# Patient Record
Sex: MALE | Race: ASIAN | NOT HISPANIC OR LATINO
[De-identification: names, ages, dates, MRNs, and addresses within clinical notes are randomized per-mention and may not be internally consistent; named-entity substitution may affect disease eponyms.]

---

## 2017-03-09 ENCOUNTER — APPOINTMENT (OUTPATIENT)
Dept: UROLOGY | Facility: CLINIC | Age: 72
End: 2017-03-09

## 2017-03-24 ENCOUNTER — APPOINTMENT (OUTPATIENT)
Dept: UROLOGY | Facility: CLINIC | Age: 72
End: 2017-03-24

## 2017-03-24 VITALS
BODY MASS INDEX: 20.25 KG/M2 | RESPIRATION RATE: 17 BRPM | HEART RATE: 96 BPM | SYSTOLIC BLOOD PRESSURE: 135 MMHG | WEIGHT: 129 LBS | OXYGEN SATURATION: 98 % | DIASTOLIC BLOOD PRESSURE: 77 MMHG | HEIGHT: 67 IN | TEMPERATURE: 98.7 F

## 2017-03-29 LAB
APPEARANCE: CLEAR
BACTERIA UR CULT: NORMAL
BACTERIA: NEGATIVE
BILIRUBIN URINE: NEGATIVE
BLOOD URINE: NEGATIVE
COLOR: YELLOW
CORE LAB FLUID CYTOLOGY: NORMAL
GLUCOSE QUALITATIVE U: NORMAL MG/DL
KETONES URINE: NEGATIVE
LEUKOCYTE ESTERASE URINE: NEGATIVE
MICROSCOPIC-UA: NORMAL
NITRITE URINE: NEGATIVE
PH URINE: 6
PROTEIN URINE: NEGATIVE MG/DL
RED BLOOD CELLS URINE: 2 /HPF
SPECIFIC GRAVITY URINE: 1.01
SQUAMOUS EPITHELIAL CELLS: 0 /HPF
UROBILINOGEN URINE: NORMAL MG/DL
WHITE BLOOD CELLS URINE: 2 /HPF

## 2019-07-16 ENCOUNTER — APPOINTMENT (OUTPATIENT)
Dept: UROLOGY | Facility: CLINIC | Age: 74
End: 2019-07-16
Payer: MEDICARE

## 2019-07-16 VITALS
OXYGEN SATURATION: 100 % | RESPIRATION RATE: 17 BRPM | WEIGHT: 125 LBS | DIASTOLIC BLOOD PRESSURE: 76 MMHG | BODY MASS INDEX: 19.58 KG/M2 | HEART RATE: 71 BPM | SYSTOLIC BLOOD PRESSURE: 133 MMHG | TEMPERATURE: 98.2 F

## 2019-07-16 DIAGNOSIS — N39.0 URINARY TRACT INFECTION, SITE NOT SPECIFIED: ICD-10-CM

## 2019-07-16 DIAGNOSIS — A49.9 URINARY TRACT INFECTION, SITE NOT SPECIFIED: ICD-10-CM

## 2019-07-16 PROCEDURE — 51798 US URINE CAPACITY MEASURE: CPT

## 2019-07-16 PROCEDURE — 99214 OFFICE O/P EST MOD 30 MIN: CPT | Mod: 25

## 2019-07-16 RX ORDER — FINASTERIDE 5 MG/1
5 TABLET, FILM COATED ORAL DAILY
Qty: 90 | Refills: 3 | Status: ACTIVE | COMMUNITY
Start: 2019-07-16 | End: 1900-01-01

## 2019-07-16 NOTE — LETTER BODY
[FreeTextEntry1] : Enrique Garcia MD\par [address]\par [address]\[place] [address]\par P [phone]\par F [phone]\par \par Dear Dr. Garcia,\par \par Reason for Visit: BPH. Urinary frequency.\par \par This is a 74-year-old gentleman with BPH status post TURP 5 years ago. The patient returns for follow up. Since the patient's last visit, he complains of frequency, weak uroflow, and hesitancy. He reports that he needs to massage his bladder in order to stimulate urination and complains of incomplete emptying of the bladder. The patient continues to take Proscar regularly with no side effects or difficulties. The patient denies any other changes in health. Patient was scheduled for a cystoscopy 2 years ago, but never returned for chis appointment. Patient denies any dysuria or hematuria.The past medical history, family history and social history are unchanged. All other review of systems are negative. Patient denies any changes in medications. Medication list was reconciled. The patient is on Coumadin and Tikosyn. \par \par On examination, the patient is a healthy-appearing gentleman in no acute distress. He is alert and oriented follows commands. He has normal mood and affect. He is normocephalic. Oral no thrush. Neck is supple. Respirations are unlabored. His abdomen is soft and nontender. Liver is nonpalpable. Bladder is nonpalpable. No CVA tenderness. Neurologically he is grossly intact. No peripheral edema. Skin without gross abnormality. He has normal male external genitalia. Normal meatus. Bilateral testes are descended intrascrotally and normal to palpation. On rectal examination, there is normal sphincter tone. The prostate is clinically benign without focal induration or nodularity.\par \par Post-void residual on bladder scan today was 40 cc. \par \par Assessment: BPH. Gross hematuria, resolved. Urinary frequency.\par \par I counseled the patient. In terms of his urinary frequency, I recommended cystoscopy to further evaluate the patient.  I counseled the patient regarding the procedure. The risks and benefits were discussed. Alternatives were given. I answered the patient questions. The patient declined cystoscopy. In terms of his BPH, I recommend he continue taking Proscar and Rapaflo as directed. The patient will follow up as directed and will contact me with any questions or concerns. Thank you for the opportunity to participate in the care of this patient. I'll keep you updated on his progress.\par \par Plan: BMP. PSA. Continue Proscar and Rapoflo. Follow up as directed.

## 2019-07-16 NOTE — ADDENDUM
[FreeTextEntry1] : Entered by Milton Thacker, acting as scribe for Dr. Robin Pride.\par \par The documentation recorded by the scribe accurately reflects the service I personally performed and the decisions made by me.

## 2019-07-18 LAB
APPEARANCE: CLEAR
BACTERIA UR CULT: NORMAL
BACTERIA: NEGATIVE
BILIRUBIN URINE: NEGATIVE
BLOOD URINE: ABNORMAL
COLOR: NORMAL
GLUCOSE QUALITATIVE U: NEGATIVE
HYALINE CASTS: 0 /LPF
KETONES URINE: NEGATIVE
LEUKOCYTE ESTERASE URINE: NEGATIVE
MICROSCOPIC-UA: NORMAL
NITRITE URINE: NEGATIVE
PH URINE: 6
PROTEIN URINE: NEGATIVE
RED BLOOD CELLS URINE: 2 /HPF
SPECIFIC GRAVITY URINE: 1.01
SQUAMOUS EPITHELIAL CELLS: 0 /HPF
URINE CYTOLOGY: NORMAL
UROBILINOGEN URINE: NORMAL
WHITE BLOOD CELLS URINE: 0 /HPF

## 2019-08-21 ENCOUNTER — RECORD ABSTRACTING (OUTPATIENT)
Age: 74
End: 2019-08-21

## 2019-11-26 ENCOUNTER — APPOINTMENT (OUTPATIENT)
Dept: UROLOGY | Facility: CLINIC | Age: 74
End: 2019-11-26
Payer: MEDICARE

## 2019-11-26 VITALS
RESPIRATION RATE: 18 BRPM | BODY MASS INDEX: 19.58 KG/M2 | WEIGHT: 125 LBS | HEART RATE: 73 BPM | SYSTOLIC BLOOD PRESSURE: 155 MMHG | DIASTOLIC BLOOD PRESSURE: 80 MMHG | OXYGEN SATURATION: 97 %

## 2019-11-26 PROCEDURE — 99214 OFFICE O/P EST MOD 30 MIN: CPT

## 2019-11-26 NOTE — LETTER BODY
[FreeTextEntry1] : Enrique Garcia MD\par [address]\par [address]\[place] [address]\par P [phone]\par F [phone]\par \par Dear Dr. Garcia,\par \par Reason for Visit: BPH. Urinary frequency.\par \par This is a 74 year-old gentleman with BPH status post TURP 5 years ago, presenting with hematuria. The patient returns for follow up. Since the patient's last visit, he obtained a urinalysis demonstrated evidence of hematuria, 11-20 RBC/HPF. He reports he has been non-compliant with his prostate medications. He has not been taking Rapaflo and Proscar regularly as directed because they did not relieve his symptoms. He continues to complain of frequency, weak uroflow, and incomplete emptying of the bladder without medication. Patient denies any dysuria or hematuria. He denies any pain currently. The past medical history, family history and social history are unchanged. All other review of systems are negative. Patient denies any changes in medications. Medication list was reconciled. The patient is on Coumadin and Tikosyn. \par \par On examination, the patient is a healthy-appearing gentleman in no acute distress. He is alert and oriented follows commands. He has normal mood and affect. He is normocephalic. Oral no thrush. Neck is supple. Respirations are unlabored. His abdomen is soft and nontender. Liver is nonpalpable. Bladder is nonpalpable. No CVA tenderness. Neurologically he is grossly intact. No peripheral edema. Skin without gross abnormality. He has normal male external genitalia. Normal meatus. Bilateral testes are descended intrascrotally and normal to palpation. On rectal examination, there is normal sphincter tone. The prostate is clinically benign without focal induration or nodularity.\par \par His BMP demonstrated normal renal functions, creatinine 0.81. His PSA was 2.33, which is within normal limits. His urinalysis demonstrated evidence of hematuria, 11-20 RBC/HPF.\par \par Assessment: BPH. Gross hematuria, resolved. Urinary frequency. Hematuria.\par \par I counseled the patient. In terms of his urinary frequency, I recommend he begin a trial of Myrbetriq to relieve his symptoms. I discussed the potential side effects of the medication. I counseled the patient on its use and side effects. If the patient develops any side effects, the patient will discontinue the medication and contact me. In terms of his hematuria, I advise the patient to follow up in 1 month for renal ultrasound to ensure stability. Risks and alternatives were discussed. I answered the patient questions. The patient will follow-up as directed and will contact me with any questions or concerns. Thank you for the opportunity to participate in the care of Mr. WILKERSON. I will keep you updated on his progress. \par \par Plan: Trial of Myrbetriq. Follow up in 1 month for renal ultrasound.

## 2019-12-17 ENCOUNTER — APPOINTMENT (OUTPATIENT)
Dept: UROLOGY | Facility: CLINIC | Age: 74
End: 2019-12-17
Payer: MEDICARE

## 2019-12-17 PROCEDURE — 99213 OFFICE O/P EST LOW 20 MIN: CPT | Mod: 25

## 2019-12-17 PROCEDURE — 76775 US EXAM ABDO BACK WALL LIM: CPT

## 2019-12-17 NOTE — LETTER BODY
[FreeTextEntry1] : Enrique Garcia MD\par [address]\[place] [address]\par P [phone]\par F [phone]\par \par Dear Dr. Garcia,\par \par Reason for Visit: BPH. Urinary frequency. Left ureteral calculus.\par \par This is a 74 year-old gentleman with BPH status post TURP 5 years ago, presenting with hematuria. The patient returns today for renal ultrasound. Since the patient's last visit, he obtained a CT abdomen and pelvis that demonstrated a left ureteral calculus. He reports he has been taking Rapaflo and Proscar regularly as directed. He reports of stable urinary symptoms with medication. He reports of frequent hydration. He denies any interval difficulties or complaints. Patient denies any dysuria or hematuria. He has no pain currently. The past medical history, family history and social history are unchanged. All other review of systems are negative. Patient denies any changes in medications. Medication list was reconciled. The patient is on Coumadin and Tikosyn. \par \par On examination, the patient is a healthy-appearing gentleman in no acute distress. He is alert and oriented follows commands. He has normal mood and affect. He is normocephalic. Oral no thrush. Neck is supple. Respirations are unlabored. His abdomen is soft and nontender. Liver is nonpalpable. Bladder is nonpalpable. No CVA tenderness. Neurologically he is grossly intact. No peripheral edema. Skin without gross abnormality. \par \par His CT abdomen and pelvis demonstrated a nonobstructing distal left ureteral calculus measuring 0.6 cm. There was also evidence of enlarged median lobe of the prostate, indenting the urinary bladder base.\par \par I personally reviewed renal ultrasound with the patient today. Images demonstrated a 6.0 mm echogenic focus with strong twinkle artifact visualized in the distal left ureteral area, probably a stone, left ureteral jet present. Both kidneys appear normal in size and echogenicity without any intrarenal stones, solid masses or hydronephrosis visualized.\par \par Assessment: BPH. Gross hematuria, resolved. Urinary frequency. Hematuria. Left ureteral stone.\par \par I counseled the patient. His renal ultrasound today demonstrated a small left ureteral stone and resolution of hydronephrosis. I advised the patient to discontinue Potassium citrate. I encouraged hydration to facilitate the passage of his stone and to prevent future stone formation. In terms of his BPH, the patient currently denies any urinary difficulties and requests to discontinue Rapaflo. I recommended the patient stop taking Rapaflo, but continue Flomax to prevent the return of his symptoms. Risks and alternatives were discussed. I answered the patient questions. He will obtain BMP and PSA today to ensure stability. The patient will follow-up as directed and will contact me with any questions or concerns. Thank you for the opportunity to participate in the care of Mr. WILKERSON. I will keep you updated on his progress. \par \par Plan: Discontinue Rapaflo and Potassium Citrate. Continue Flomax. BMP. PSA. Follow up in 6 months for renal US.

## 2020-01-14 ENCOUNTER — APPOINTMENT (OUTPATIENT)
Dept: UROLOGY | Facility: CLINIC | Age: 75
End: 2020-01-14

## 2020-06-23 ENCOUNTER — APPOINTMENT (OUTPATIENT)
Dept: UROLOGY | Facility: CLINIC | Age: 75
End: 2020-06-23

## 2023-07-25 ENCOUNTER — APPOINTMENT (OUTPATIENT)
Dept: UROLOGY | Facility: CLINIC | Age: 78
End: 2023-07-25
Payer: MEDICARE

## 2023-07-25 VITALS
WEIGHT: 110 LBS | RESPIRATION RATE: 18 BRPM | DIASTOLIC BLOOD PRESSURE: 75 MMHG | SYSTOLIC BLOOD PRESSURE: 116 MMHG | TEMPERATURE: 97.8 F | OXYGEN SATURATION: 96 % | BODY MASS INDEX: 17.23 KG/M2 | HEART RATE: 106 BPM

## 2023-07-25 DIAGNOSIS — N13.8 BENIGN PROSTATIC HYPERPLASIA WITH LOWER URINARY TRACT SYMPMS: ICD-10-CM

## 2023-07-25 DIAGNOSIS — Z00.00 ENCOUNTER FOR GENERAL ADULT MEDICAL EXAMINATION W/OUT ABNORMAL FINDINGS: ICD-10-CM

## 2023-07-25 DIAGNOSIS — R31.0 GROSS HEMATURIA: ICD-10-CM

## 2023-07-25 DIAGNOSIS — N28.1 CYST OF KIDNEY, ACQUIRED: ICD-10-CM

## 2023-07-25 DIAGNOSIS — R97.20 ELEVATED PROSTATE, SPECIFIC ANTIGEN [PSA]: ICD-10-CM

## 2023-07-25 DIAGNOSIS — N40.1 BENIGN PROSTATIC HYPERPLASIA WITH LOWER URINARY TRACT SYMPMS: ICD-10-CM

## 2023-07-25 PROCEDURE — 52000 CYSTOURETHROSCOPY: CPT

## 2023-07-25 PROCEDURE — 76775 US EXAM ABDO BACK WALL LIM: CPT

## 2023-07-25 PROCEDURE — 99204 OFFICE O/P NEW MOD 45 MIN: CPT | Mod: 25

## 2023-07-25 RX ORDER — MIRABEGRON 25 MG/1
25 TABLET, FILM COATED, EXTENDED RELEASE ORAL
Qty: 30 | Refills: 3 | Status: DISCONTINUED | COMMUNITY
Start: 2019-11-26 | End: 2023-07-25

## 2023-07-25 RX ORDER — SILODOSIN 8 MG/1
8 CAPSULE ORAL DAILY
Qty: 90 | Refills: 3 | Status: ACTIVE | COMMUNITY
Start: 2019-07-16 | End: 1900-01-01

## 2023-07-25 NOTE — ADDENDUM
[FreeTextEntry1] : Entered by Lina Márquez, acting as scribe for Dr. Robin Pride.\par The documentation recorded by the scribe accurately reflects the service I personally performed and the decisions made by me.

## 2023-07-25 NOTE — LETTER BODY
[FreeTextEntry1] : Radha Murphy MD\par [address] [place] [address], \[place] [address]\par [phone]\par \par Dear Dr. Murphy,\par \par Reason for visit: Hematuria. BPH\par \par This is a 78 year year-old male with hematuria and BPH referred for evaluation. He was last seen 4 years ago. He declined follow-up for pandemic. Patient reports he has weak uroflow, frequency, and hesitancy. He denies any hematuria or urinary incontinence. His symptoms are aggravated by hydration. He denies any alleviating factors. He has tried Rapaflo and Proscar for medical therapy previously. He reports no pain. He denies any dysuria or urinary incontinence. The patient denies any aggravating or relieving factors. The patient denies any interference of function. The patient is entirely asymptomatic. All other review of systems are negative. He has no cancer in his family medical history. He has no previous surgical history. Past medical history, family history and social history were inquired and were noncontributory to current condition. The patient does not use tobacco or drink alcohol.\par \par Medications and allergies were reviewed. He has no known allergies to medication. On examination, the patient is a well-appearing male in no acute distress. He is alert and\par oriented and follows commands. He has normal mood and affect. He is normocephalic. Neck is supple. Oral no thrush. Respirations are unlabored. Abdomen is soft and nontender. Bladder is nonpalpable. No CVA tenderness. Neurologically He is grossly intact. No peripheral edema. Skin without gross abnormality.\par \par Assessment: Hematuria. BPH.\par \par I counseled the patient on the various etiologies of the hematuria. I discussed the risk of occult malignancy. I counseled the patient about hematuria. I recommended patient obtain urinalysis, urine cytology, cystoscopy, and renal ultrasound. I counseled the patient about the procedures. In terms of BPH, I discussed the natural history of BPH and the treatment options available. I discussed the options of conservative management with fluid in dietary restrictions, herbal therapy, medical therapy, and minimally invasive procedures. Risk and benefits were discussed. I renewed the patient's prescription for Proscar and Rapaflo today. I encouraged the patient to continue medications regularly as directed. He will repeat PSA and BMP to establish baseline. I answered the patient's questions. The risks and expected outcomes were discussed. The patient will follow up as directed and contact me with any questions or concerns. Thank you for the opportunity to participate in the care of Mr. WILKERSON. I will keep you updated on his progress.\par \par Plan: Cystoscopy. Renal ultrasound. Urinalysis. Urine cytology. BMP. PSA. Follow up as directed.

## 2023-08-05 LAB
ANION GAP SERPL CALC-SCNC: 13 MMOL/L
APPEARANCE: CLEAR
BACTERIA: NEGATIVE /HPF
BILIRUBIN URINE: NEGATIVE
BLOOD URINE: ABNORMAL
BUN SERPL-MCNC: 15 MG/DL
CALCIUM SERPL-MCNC: 9.4 MG/DL
CAST: 1 /LPF
CHLORIDE SERPL-SCNC: 98 MMOL/L
CO2 SERPL-SCNC: 22 MMOL/L
COLOR: YELLOW
CREAT SERPL-MCNC: 0.8 MG/DL
EGFR: 91 ML/MIN/1.73M2
EPITHELIAL CELLS: 1 /HPF
GLUCOSE QUALITATIVE U: NEGATIVE MG/DL
GLUCOSE SERPL-MCNC: 201 MG/DL
KETONES URINE: NEGATIVE MG/DL
LEUKOCYTE ESTERASE URINE: ABNORMAL
MICROSCOPIC-UA: NORMAL
NITRITE URINE: NEGATIVE
PH URINE: 6
POTASSIUM SERPL-SCNC: 4 MMOL/L
PROTEIN URINE: NORMAL MG/DL
PSA FREE FLD-MCNC: 31 %
PSA FREE SERPL-MCNC: 0.58 NG/ML
PSA SERPL-MCNC: 1.86 NG/ML
RED BLOOD CELLS URINE: 7 /HPF
SODIUM SERPL-SCNC: 134 MMOL/L
SPECIFIC GRAVITY URINE: 1.01
URINE CYTOLOGY: NORMAL
UROBILINOGEN URINE: 0.2 MG/DL
WHITE BLOOD CELLS URINE: 6 /HPF

## 2023-09-05 ENCOUNTER — NON-APPOINTMENT (OUTPATIENT)
Age: 78
End: 2023-09-05

## 2023-09-19 ENCOUNTER — APPOINTMENT (OUTPATIENT)
Dept: UROLOGY | Facility: CLINIC | Age: 78
End: 2023-09-19
Payer: MEDICARE

## 2023-09-19 VITALS
SYSTOLIC BLOOD PRESSURE: 137 MMHG | TEMPERATURE: 98.1 F | HEART RATE: 84 BPM | DIASTOLIC BLOOD PRESSURE: 75 MMHG | BODY MASS INDEX: 17.7 KG/M2 | OXYGEN SATURATION: 98 % | WEIGHT: 113 LBS | RESPIRATION RATE: 18 BRPM

## 2023-09-19 PROCEDURE — 99213 OFFICE O/P EST LOW 20 MIN: CPT

## 2023-09-20 LAB
APPEARANCE: CLEAR
BACTERIA: NEGATIVE /HPF
BILIRUBIN URINE: NEGATIVE
BLOOD URINE: NEGATIVE
CAST: 0 /LPF
COLOR: YELLOW
EPITHELIAL CELLS: 0 /HPF
GLUCOSE QUALITATIVE U: NEGATIVE MG/DL
KETONES URINE: NEGATIVE MG/DL
LEUKOCYTE ESTERASE URINE: ABNORMAL
MICROSCOPIC-UA: NORMAL
NITRITE URINE: NEGATIVE
PH URINE: 5.5
PROTEIN URINE: NEGATIVE MG/DL
RED BLOOD CELLS URINE: 2 /HPF
SPECIFIC GRAVITY URINE: 1.02
URINE CYTOLOGY: NORMAL
UROBILINOGEN URINE: 0.2 MG/DL
WHITE BLOOD CELLS URINE: 11 /HPF

## 2024-07-13 ENCOUNTER — NON-APPOINTMENT (OUTPATIENT)
Age: 79
End: 2024-07-13

## 2024-08-05 ENCOUNTER — NON-APPOINTMENT (OUTPATIENT)
Age: 79
End: 2024-08-05

## 2024-08-06 ENCOUNTER — APPOINTMENT (OUTPATIENT)
Dept: UROLOGY | Facility: CLINIC | Age: 79
End: 2024-08-06

## 2024-08-06 PROCEDURE — G2211 COMPLEX E/M VISIT ADD ON: CPT | Mod: NC

## 2024-08-06 PROCEDURE — 99214 OFFICE O/P EST MOD 30 MIN: CPT | Mod: 25

## 2024-08-06 PROCEDURE — 51798 US URINE CAPACITY MEASURE: CPT

## 2024-08-06 NOTE — ADDENDUM
[FreeTextEntry1] : Entered by Ham Patel, acting as scribe for Dr. Robin Pride. The documentation recorded by the scribe accurately reflects the service I personally performed and the decisions made by me.

## 2024-08-06 NOTE — LETTER BODY
[FreeTextEntry1] : Radha Murphy MD  [address] [place] [address]  [phone]    Dear Dr. Murphy,    Reason for visit: Hematuria. BPH    This is a 79-year-old male with hematuria and BPH. The patient has a history of prostate surgery in 2014. Patient is here for follow-up. Since he was last seen, the patient was diagnosed with anemia. The type was unspecified. He also reports obtaining a negative colonoscopy and endoscopy. The patient reports taking Rapaflo and Proscar.  Patient reports taking the medications regularly without any side effects or difficulties with the medication. He notes progressive urinary symptoms despite medical therapy. He denies any hematuria or urinary incontinence. His symptoms are aggravated by hydration. He denies any alleviating factors. He reports no pain. He denies any dysuria or urinary incontinence. The patient denies any aggravating or relieving factors. The patient denies any interference of function. The patient is entirely asymptomatic. All other review of systems are negative. He has no cancer in his family medical history. He has no previous surgical history. Past medical history, family history and social history were inquired and were noncontributory to current condition. The patient does not use tobacco or drink alcohol.    Medications and allergies were reviewed. He has no known allergies to medication. On examination, the patient is a well-appearing male in no acute distress. He is alert and oriented and follows commands. He has normal mood and affect. He is normocephalic. Neck is supple. Oral no thrush. Respirations are unlabored. Abdomen is soft and nontender. Bladder is nonpalpable. No CVA tenderness. Neurologically He is grossly intact. No peripheral edema. Skin without gross abnormality.   His BMP demonstrated normal renal function, creatinine 0.80. His PSA was 1.86, WNL.    Post-void residual on bladder scan today was 15 cc.    Assessment: Hematuria. BPH.    I counseled the patient. His PVR today was 15 cc. In terms of his BPH, the patient reported progressive symptoms despite taking Rapaflo and Proscar. I recommended the patient undergo cystoscopy and UDS to evaluate the urinary outlet. I counseled the patient regarding the procedure. The patient will take the necessary preparations for the procedure. He will also continue to take Rapaflo and Proscar.  I renewed the patient's prescription for Rapaflo and Proscar today. I encouraged the patient to continue medications regularly as directed. He will repeat PSA and BMP to establish baseline.  I also recommended patient obtain urinalysis and urine cytology to look for high grade urothelial carcinoma. I answered the patient's questions. The risks and expected outcomes were discussed. The patient will follow up as directed and contact me with any questions or concerns. Thank you for the opportunity to participate in the care of Mr. WILKERSON. I will keep you updated on his progress.    Plan: Continue Rapaflo and Proscar. Cystoscopy. UDS. PSA. BMP. Urinalysis. Urine cytology. Follow up as directed.

## 2024-09-05 ENCOUNTER — APPOINTMENT (OUTPATIENT)
Dept: UROLOGY | Facility: CLINIC | Age: 79
End: 2024-09-05
Payer: MEDICARE

## 2024-09-05 DIAGNOSIS — N13.8 BENIGN PROSTATIC HYPERPLASIA WITH LOWER URINARY TRACT SYMPMS: ICD-10-CM

## 2024-09-05 DIAGNOSIS — R97.20 ELEVATED PROSTATE, SPECIFIC ANTIGEN [PSA]: ICD-10-CM

## 2024-09-05 DIAGNOSIS — N40.1 BENIGN PROSTATIC HYPERPLASIA WITH LOWER URINARY TRACT SYMPMS: ICD-10-CM

## 2024-09-05 PROCEDURE — 51728 CYSTOMETROGRAM W/VP: CPT | Mod: 26

## 2024-09-05 PROCEDURE — 51741 ELECTRO-UROFLOWMETRY FIRST: CPT | Mod: 26

## 2024-09-05 PROCEDURE — 51784 ANAL/URINARY MUSCLE STUDY: CPT | Mod: 26

## 2024-09-05 PROCEDURE — 99214 OFFICE O/P EST MOD 30 MIN: CPT | Mod: 25

## 2024-09-05 PROCEDURE — 52000 CYSTOURETHROSCOPY: CPT

## 2024-09-05 PROCEDURE — 51797 INTRAABDOMINAL PRESSURE TEST: CPT | Mod: 26

## 2024-09-05 RX ORDER — TROSPIUM CHLORIDE 20 MG/1
20 TABLET, FILM COATED ORAL
Qty: 30 | Refills: 3 | Status: ACTIVE | COMMUNITY
Start: 2024-09-05 | End: 1900-01-01

## 2024-09-07 NOTE — LETTER BODY
[FreeTextEntry1] :   Radha Murphy MD  [address] [place] [address]  [phone]    Dear Dr. Murphy,    Reason for visit: Hematuria. BPH    This is a 79-year-old male with hematuria and BPH. The patient has a history of prostate surgery in 2014. Patient has anemia. The type was unspecified. He also obtained  a negative colonoscopy and endoscopy.  Patient is here for follow-up cystoscopy and UDS. Cystoscopy today demonstrated open bladder without evidence of obstruction. UDS demonstrated evidence of detrusor stability with mild poor bladder compliance. Patient was on Rapaflo and Proscar with progressive urinary symptoms despite medical therapy. He denies any hematuria or urinary incontinence. His symptoms are aggravated by hydration. He denies any alleviating factors. He reports no pain. He denies any dysuria or urinary incontinence. The patient denies any aggravating or relieving factors. The patient denies any interference of function. The patient is entirely asymptomatic. All other review of systems are negative. He has no cancer in his family medical history. He has no previous surgical history. Past medical history, family history and social history were inquired and were noncontributory to current condition. The patient does not use tobacco or drink alcohol.    Medications and allergies were reviewed. He has no known allergies to medication. On examination, the patient is a well-appearing male in no acute distress. He is alert and oriented and follows commands. He has normal mood and affect. He is normocephalic. Neck is supple. Oral no thrush. Respirations are unlabored. Abdomen is soft and nontender. Bladder is nonpalpable. No CVA tenderness. Neurologically He is grossly intact. No peripheral edema. Skin without gross abnormality.   His BMP demonstrated normal renal function, creatinine 0.79. His PSA was 1.27, WNL.  His cystoscopy today demonstrated open bladder without evidence of obstruction.  HIs urodynamics demonstrated evidence of detrusor stability with mild poor bladder compliance.   Assessment: Hematuria. BPH.   I counseled the patient. In terms of his BPH, his cystoscopy today demonstrated an open bladder with no evidence of obstruction of the urinary outlet. His urodynamics demonstrated evidence of detrusor stability with mild poor bladder compliance. I discussed the treatment options available to the patient.  The patient declined Botox. I recommended he start a trial of Trospium. I discussed the potential side effects of the medication. I counseled the patient on its use and side effects. If the patient develops any side effects, the patient will discontinue medication and contact me. I answered the patient's questions. The risks and expected outcomes were discussed. The patient will follow up as directed and contact me with any questions or concerns. Thank you for the opportunity to participate in the care of Mr. WILKERSON. I will keep you updated on his progress.    Plan: Declined Botox. Trial of Trospium. Follow up in 1 month.

## 2024-09-07 NOTE — HISTORY OF PRESENT ILLNESS
[FreeTextEntry1] : Follow-up BPH.  Cystoscopy today demonstrated open bladder without evidence of obstruction.  UDS demonstrated evidence of detrusor stability with mild poor bladder compliance.  Patient declines Botox.  Trial of trospium.  Follow-up appointment 1 month.  Please refer to URO Consult Note.

## 2024-09-07 NOTE — LETTER BODY
[FreeTextEntry1] :   Radha Murphy MD  [address] [place] [address]  [phone]    Dear Dr. Murphy,    Reason for visit: Hematuria. BPH    This is a 79-year-old male with hematuria and BPH. The patient has a history of prostate surgery in 2014. Patient has anemia. The type was unspecified. He also obtained  a negative colonoscopy and endoscopy.  Patient is here for follow-up cystoscopy and UDS. Cystoscopy today demonstrated open bladder without evidence of obstruction. UDS demonstrated evidence of detrusor stability with mild poor bladder compliance. Patient was on Rapaflo and Proscar with progressive urinary symptoms despite medical therapy. He denies any hematuria or urinary incontinence. His symptoms are aggravated by hydration. He denies any alleviating factors. He reports no pain. He denies any dysuria or urinary incontinence. The patient denies any aggravating or relieving factors. The patient denies any interference of function. The patient is entirely asymptomatic. All other review of systems are negative. He has no cancer in his family medical history. He has no previous surgical history. Past medical history, family history and social history were inquired and were noncontributory to current condition. The patient does not use tobacco or drink alcohol.    Medications and allergies were reviewed. He has no known allergies to medication. On examination, the patient is a well-appearing male in no acute distress. He is alert and oriented and follows commands. He has normal mood and affect. He is normocephalic. Neck is supple. Oral no thrush. Respirations are unlabored. Abdomen is soft and nontender. Bladder is nonpalpable. No CVA tenderness. Neurologically He is grossly intact. No peripheral edema. Skin without gross abnormality.   His BMP demonstrated normal renal function, creatinine 0.79. His PSA was 1.27, WNL.  His cystoscopy today demonstrated open bladder without evidence of obstruction.  HIs urodynamics demonstrated evidence of detrusor stability with mild poor bladder compliance.   Assessment: Hematuria. BPH.   I counseled the patient. In terms of his BPH, his cystoscopy today demonstrated an open bladder with no evidence of obstruction of the urinary outlet. His urodynamics demonstrated evidence of detrusor stability with mild poor bladder compliance. I discussed the treatment options available to the patient.  The patient declined Botox. I recommended he start a trial of Trospium. I discussed the potential side effects of the medication. I counseled the patient on its use and side effects. If the patient develops any side effects, the patient will discontinue medication and contact me. I answered the patient's questions. The risks and expected outcomes were discussed. The patient will follow up as directed and contact me with any questions or concerns. Thank you for the opportunity to participate in the care of Mr. WILKERSON. I will keep you updated on his progress.    Plan: Declined Botox. Trial of Trospium. Follow up in 1 month.